# Patient Record
Sex: MALE | Race: OTHER | HISPANIC OR LATINO | URBAN - METROPOLITAN AREA
[De-identification: names, ages, dates, MRNs, and addresses within clinical notes are randomized per-mention and may not be internally consistent; named-entity substitution may affect disease eponyms.]

---

## 2017-01-31 ENCOUNTER — EMERGENCY (EMERGENCY)
Facility: HOSPITAL | Age: 42
LOS: 1 days | Discharge: HOME | End: 2017-01-31
Attending: EMERGENCY MEDICINE | Admitting: EMERGENCY MEDICINE
Payer: SELF-PAY

## 2017-01-31 VITALS
DIASTOLIC BLOOD PRESSURE: 76 MMHG | OXYGEN SATURATION: 98 % | TEMPERATURE: 98 F | RESPIRATION RATE: 16 BRPM | HEART RATE: 94 BPM | SYSTOLIC BLOOD PRESSURE: 113 MMHG

## 2017-01-31 PROCEDURE — 99284 EMERGENCY DEPT VISIT MOD MDM: CPT

## 2017-01-31 PROCEDURE — 93971 EXTREMITY STUDY: CPT | Mod: 26

## 2017-01-31 NOTE — ED PROVIDER NOTE - OBJECTIVE STATEMENT
Patient PMHx homeless, ETOH abuse, states that he had 2 drinks today BIBEMS for R leg pain. denies history of trauma or fall. history limited due to intoxicated states.

## 2017-02-03 DIAGNOSIS — M79.661 PAIN IN RIGHT LOWER LEG: ICD-10-CM

## 2017-02-03 DIAGNOSIS — M79.671 PAIN IN RIGHT FOOT: ICD-10-CM

## 2017-02-14 ENCOUNTER — EMERGENCY (EMERGENCY)
Facility: HOSPITAL | Age: 42
LOS: 1 days | Discharge: HOME | End: 2017-02-14
Attending: EMERGENCY MEDICINE | Admitting: EMERGENCY MEDICINE
Payer: SELF-PAY

## 2017-02-14 VITALS
HEART RATE: 111 BPM | DIASTOLIC BLOOD PRESSURE: 75 MMHG | SYSTOLIC BLOOD PRESSURE: 129 MMHG | RESPIRATION RATE: 18 BRPM | TEMPERATURE: 99 F | OXYGEN SATURATION: 98 %

## 2017-02-14 DIAGNOSIS — R25.1 TREMOR, UNSPECIFIED: ICD-10-CM

## 2017-02-14 DIAGNOSIS — F10.230 ALCOHOL DEPENDENCE WITH WITHDRAWAL, UNCOMPLICATED: ICD-10-CM

## 2017-02-14 DIAGNOSIS — R41.82 ALTERED MENTAL STATUS, UNSPECIFIED: ICD-10-CM

## 2017-02-14 LAB
ALBUMIN SERPL ELPH-MCNC: 3.7 G/DL — SIGNIFICANT CHANGE UP (ref 3.4–5)
ALP SERPL-CCNC: 113 U/L — SIGNIFICANT CHANGE UP (ref 40–120)
ALT FLD-CCNC: 146 U/L — HIGH (ref 12–42)
ANION GAP SERPL CALC-SCNC: 9 MMOL/L — SIGNIFICANT CHANGE UP (ref 9–16)
APTT BLD: 30.9 SEC — SIGNIFICANT CHANGE UP (ref 27.5–36.5)
AST SERPL-CCNC: 137 U/L — HIGH (ref 15–37)
BILIRUB SERPL-MCNC: 1.1 MG/DL — SIGNIFICANT CHANGE UP (ref 0.2–1.2)
BUN SERPL-MCNC: 5 MG/DL — LOW (ref 7–23)
CALCIUM SERPL-MCNC: 9.6 MG/DL — SIGNIFICANT CHANGE UP (ref 8.5–10.5)
CHLORIDE SERPL-SCNC: 94 MMOL/L — LOW (ref 96–108)
CO2 SERPL-SCNC: 30 MMOL/L — SIGNIFICANT CHANGE UP (ref 22–31)
CREAT SERPL-MCNC: 0.73 MG/DL — SIGNIFICANT CHANGE UP (ref 0.5–1.3)
ETHANOL SERPL-MCNC: <3 MG/DL — SIGNIFICANT CHANGE UP
GLUCOSE SERPL-MCNC: 100 MG/DL — HIGH (ref 70–99)
HCT VFR BLD CALC: 36.9 % — LOW (ref 39–50)
HGB BLD-MCNC: 13 G/DL — SIGNIFICANT CHANGE UP (ref 13–17)
INR BLD: 1.02 — SIGNIFICANT CHANGE UP (ref 0.88–1.16)
MCHC RBC-ENTMCNC: 32.7 PG — SIGNIFICANT CHANGE UP (ref 27–34)
MCHC RBC-ENTMCNC: 35.2 G/DL — SIGNIFICANT CHANGE UP (ref 32–36)
MCV RBC AUTO: 92.9 FL — SIGNIFICANT CHANGE UP (ref 80–100)
PLATELET # BLD AUTO: 71 K/UL — LOW (ref 150–400)
POTASSIUM SERPL-MCNC: 3.6 MMOL/L — SIGNIFICANT CHANGE UP (ref 3.5–5.3)
POTASSIUM SERPL-SCNC: 3.6 MMOL/L — SIGNIFICANT CHANGE UP (ref 3.5–5.3)
PROT SERPL-MCNC: 8.5 G/DL — HIGH (ref 6.4–8.2)
PROTHROM AB SERPL-ACNC: 11.2 SEC — SIGNIFICANT CHANGE UP (ref 10–13.1)
RBC # BLD: 3.97 M/UL — LOW (ref 4.2–5.8)
RBC # FLD: 13 % — SIGNIFICANT CHANGE UP (ref 10.3–16.9)
SODIUM SERPL-SCNC: 133 MMOL/L — SIGNIFICANT CHANGE UP (ref 132–145)
WBC # BLD: 7.9 K/UL — SIGNIFICANT CHANGE UP (ref 3.8–10.5)
WBC # FLD AUTO: 7.9 K/UL — SIGNIFICANT CHANGE UP (ref 3.8–10.5)

## 2017-02-14 PROCEDURE — 99218: CPT

## 2017-02-14 RX ADMIN — Medication 25 MILLIGRAM(S): at 19:10

## 2017-02-14 RX ADMIN — Medication 100 MILLIGRAM(S): at 20:51

## 2017-02-14 NOTE — ED ADULT TRIAGE NOTE - CHIEF COMPLAINT QUOTE
pt biba for seizures. EMS states bystanders called. pt is alert and oriented x3, noted with tremors. states last drink was last night. pt does not take seizure medication.

## 2017-02-14 NOTE — ED CDU PROVIDER NOTE - DETAILS
See ED provider note for HPI/ROS/PE/MDM. See ED provider note for HPI/ROS/PE/MDM.  Family history is non-contributory.

## 2017-02-14 NOTE — ED CDU PROVIDER NOTE - MEDICAL DECISION MAKING DETAILS
Patient BIBEMS for public alcohol intoxication. No evidence of head or extremity trauma.  Abdomen not distended.  Observe to clinical sobriety. Patient placed on obs for EtOH withdraw and control of sx with Librium and hydration.

## 2017-02-14 NOTE — ED PROVIDER NOTE - MEDICAL DECISION MAKING DETAILS
Patient BIBEMS for public alcohol intoxication. No evidence of head or extremity trauma.  Abdomen not distended.  Observe to clinical sobriety.

## 2017-02-14 NOTE — ED PROVIDER NOTE - OBJECTIVE STATEMENT
42 yo male with a history of alcoholism BIBEMS for public alcohol intoxication today. Pt admits to drinking alcohol. No evidence of trauma, seizure or vomiting. Pt has no other complaints at this time.

## 2017-02-14 NOTE — ED CDU PROVIDER NOTE - PROGRESS NOTE DETAILS
Patient has required multiple spaced out doses of Librium. HR down after IVF and tremors have ceased. Patient would like to be d/c'd at [facility]. Will plan for this unless withdrawal worsens significantly. WILLIAM Brumfield MD HR 81, patient feeling better, wants to be d/c so he can make ti to his shelter bed. Will d/c.

## 2017-02-14 NOTE — ED CDU PROVIDER NOTE - NS ED MD SCRIBE ATTENDING SCRIBE SECTIONS
HIV/REVIEW OF SYSTEMS/DISPOSITION/HISTORY OF PRESENT ILLNESS/PAST MEDICAL/SURGICAL/SOCIAL HISTORY/VITAL SIGNS( Pullset)/PHYSICAL EXAM

## 2017-02-15 VITALS
DIASTOLIC BLOOD PRESSURE: 68 MMHG | HEART RATE: 87 BPM | TEMPERATURE: 99 F | OXYGEN SATURATION: 99 % | SYSTOLIC BLOOD PRESSURE: 121 MMHG | RESPIRATION RATE: 18 BRPM

## 2017-02-15 PROCEDURE — 99217: CPT

## 2017-02-15 RX ORDER — SODIUM CHLORIDE 9 MG/ML
1000 INJECTION INTRAMUSCULAR; INTRAVENOUS; SUBCUTANEOUS ONCE
Qty: 0 | Refills: 0 | Status: COMPLETED | OUTPATIENT
Start: 2017-02-15 | End: 2017-02-15

## 2017-02-15 RX ADMIN — Medication 100 MILLIGRAM(S): at 02:48

## 2017-02-15 RX ADMIN — Medication 100 MILLIGRAM(S): at 00:13

## 2017-02-15 RX ADMIN — SODIUM CHLORIDE 1000 MILLILITER(S): 9 INJECTION INTRAMUSCULAR; INTRAVENOUS; SUBCUTANEOUS at 05:00

## 2017-02-15 NOTE — ED ADULT NURSE REASSESSMENT NOTE - NS ED NURSE REASSESS COMMENT FT1
assumed care of patient back from [facility]; pt sleeping in stretcher; given another round of Librium as per [facility]; pt on monitor

## 2017-02-15 NOTE — ED ADULT NURSE REASSESSMENT NOTE - NS ED NURSE REASSESS COMMENT FT1
pt alert and oriented x3, respirations even and unlabored, no c/o pain. tremors noted. medicated as per MD orders with librium

## 2023-12-08 NOTE — ED PROVIDER NOTE - CARE PLAN
MRN/Chart #:  [id number]    [facility]     AUTHORIZATION FOR DISCLOSURE OF HEALTH INFORMATION   FORMS COMPLETION     Michelle HAYES Katie Ann     Previous Names:   Phone Number:  [phone]   YOB: 1991     AUTHORIZE RELEASE FROM:       [facility] MEDICAL INFORMATION  [address] [place] [address]  [phone]          DISCLOSE PROTECTED HEALTH INFORMATION TO:  Employer/Disability Insurance Carrier/Other   Street Address:    City State Zip:           ALL INFORMATION BELOW MUST BE COMPLETED BY PATIENT IN ORDER TO PROCESS FORM.   This Authorization may include the release of protected health information related to this forms request.     Doctor’s Name:________________________________________________________________________________    Type of Illness/Injury:__________________________________________________________________________   Date of Illness/Injury (if pregnancy-estimated due date): _____________________________________     First Date Off Work (if applicable): ____________________________________     Return to Work Date (if applicable):  ___________________________________     Intermittent Leave (patient or caretaker):     YES: ____  Date Range: ____________________________     NO: _____  Date Range:  ____________________________     PLEASE NOTE: Provider has fourteen (14) working days to complete the FMLA/Disability form.     Please indicate a delivery option below. All requests will be mailed to the patient unless otherwise indicated.   ? Pickup: Location: _______________________________________________________________  Call when ready phone #___________________________________________________________   ? Mail: (List address if other than above) ______________________________________________   ? Fax: Fax Number: ______________________________________________________________  Attention:  _______________________________________________________________________   PURPOSE FOR NEED OF DISCLOSURE: Forms Completion- [facility]’s policy is that the minimum necessary information to accomplish the purpose of the request will be disclosed.   YOUR RIGHTS WITH RESPECT TO THIS AUTHORIZATION:   I am aware that I have the right to inspect and receive a copy of the health information I have authorized to be used and/or disclosed by this Authorization. I understand that I may be charged a fee for record copies. In addition, I understand that I do not need to sign this Authorization in order to receive treatment. I also am aware that I may revoke this Authorization by notifying the Health Information department Corporate Office Building at @DBLINK([email or web address],,,). However, I understand that my revocation will not be effective as to uses and/or disclosures: (1) already made in reliance upon this Authorization; or (2) needed for an insurer to contest a claim/policy as authorized by law if signing the Authorization was a condition to obtaining insurance coverage. I realize that the information used and/or disclosed pursuant to this Authorization may be subject to re-disclosure and no longer protected by federal privacy law.     Expiration Date: This authorization shall  on the following date or event that relates to the individual or the purpose of the use or disclosure:_______________________________________________.    If no date or event is specified, this authorization will  one (1) year from the date signed.     SIGNATURE OF PATIENT / LEGAL REP: ____________________________________________    DATE: _____________________     If signed by person other than patient, state relationship and authority to do so.   Patient is:   ? Minor   ? Incompetent   ? Disabled   ?      Legal Authority (Optional):   ? Parent   ? Legal Guardian   ? Authorized Legal Representative   ? Power of    ?  Power of  for Healthcare   ? Executor of Estate of    ? Spouse/Next of kin of  (Specify relationship) __________________________________     This release is executed in conformity with [place] Statues and Federal Privacy Regulations. FORM NAME: Authorization for Disclosure of Health Information -Forms Completion  CHT Number [facility]  Date Rev: 3/4/03,10/15/03; 06; , ; 1/10, 9/10, , , 10//14, 6/15, , 17         Principal Discharge DX:	Altered mental status  Secondary Diagnosis:	ETOH abuse  Secondary Diagnosis:	Tremor
